# Patient Record
Sex: FEMALE | Race: WHITE | Employment: FULL TIME | URBAN - METROPOLITAN AREA
[De-identification: names, ages, dates, MRNs, and addresses within clinical notes are randomized per-mention and may not be internally consistent; named-entity substitution may affect disease eponyms.]

---

## 2022-08-25 ENCOUNTER — APPOINTMENT (OUTPATIENT)
Dept: GENERAL RADIOLOGY | Age: 57
End: 2022-08-25
Payer: COMMERCIAL

## 2022-08-25 ENCOUNTER — APPOINTMENT (OUTPATIENT)
Dept: CT IMAGING | Age: 57
End: 2022-08-25
Payer: COMMERCIAL

## 2022-08-25 ENCOUNTER — HOSPITAL ENCOUNTER (OUTPATIENT)
Age: 57
Setting detail: OBSERVATION
Discharge: HOME HEALTH | End: 2022-08-26
Attending: STUDENT IN AN ORGANIZED HEALTH CARE EDUCATION/TRAINING PROGRAM | Admitting: INTERNAL MEDICINE
Payer: COMMERCIAL

## 2022-08-25 ENCOUNTER — APPOINTMENT (OUTPATIENT)
Dept: MRI IMAGING | Age: 57
End: 2022-08-25
Payer: COMMERCIAL

## 2022-08-25 DIAGNOSIS — G08 TRANSVERSE SINUS THROMBOSIS: Primary | ICD-10-CM

## 2022-08-25 LAB
ALBUMIN SERPL-MCNC: 4.5 G/DL (ref 3.5–5.2)
ALP BLD-CCNC: 102 U/L (ref 35–104)
ALT SERPL-CCNC: 18 U/L (ref 0–32)
ANION GAP SERPL CALCULATED.3IONS-SCNC: 12 MMOL/L (ref 7–16)
AST SERPL-CCNC: 20 U/L (ref 0–31)
BASOPHILS ABSOLUTE: 0.05 E9/L (ref 0–0.2)
BASOPHILS RELATIVE PERCENT: 0.6 % (ref 0–2)
BILIRUB SERPL-MCNC: 0.3 MG/DL (ref 0–1.2)
BUN BLDV-MCNC: 22 MG/DL (ref 6–20)
CALCIUM SERPL-MCNC: 9.8 MG/DL (ref 8.6–10.2)
CHLORIDE BLD-SCNC: 104 MMOL/L (ref 98–107)
CO2: 23 MMOL/L (ref 22–29)
CREAT SERPL-MCNC: 0.8 MG/DL (ref 0.5–1)
EKG ATRIAL RATE: 100 BPM
EKG P AXIS: 47 DEGREES
EKG P-R INTERVAL: 132 MS
EKG Q-T INTERVAL: 350 MS
EKG QRS DURATION: 88 MS
EKG QTC CALCULATION (BAZETT): 451 MS
EKG R AXIS: 59 DEGREES
EKG T AXIS: -9 DEGREES
EKG VENTRICULAR RATE: 100 BPM
EOSINOPHILS ABSOLUTE: 0.26 E9/L (ref 0.05–0.5)
EOSINOPHILS RELATIVE PERCENT: 3.3 % (ref 0–6)
GFR AFRICAN AMERICAN: >60
GFR NON-AFRICAN AMERICAN: >60 ML/MIN/1.73
GLUCOSE BLD-MCNC: 117 MG/DL (ref 74–99)
HCT VFR BLD CALC: 41.6 % (ref 34–48)
HEMOGLOBIN: 14 G/DL (ref 11.5–15.5)
IMMATURE GRANULOCYTES #: 0.04 E9/L
IMMATURE GRANULOCYTES %: 0.5 % (ref 0–5)
LYMPHOCYTES ABSOLUTE: 2.07 E9/L (ref 1.5–4)
LYMPHOCYTES RELATIVE PERCENT: 26.2 % (ref 20–42)
MCH RBC QN AUTO: 32.9 PG (ref 26–35)
MCHC RBC AUTO-ENTMCNC: 33.7 % (ref 32–34.5)
MCV RBC AUTO: 97.7 FL (ref 80–99.9)
MONOCYTES ABSOLUTE: 0.5 E9/L (ref 0.1–0.95)
MONOCYTES RELATIVE PERCENT: 6.3 % (ref 2–12)
NEUTROPHILS ABSOLUTE: 4.97 E9/L (ref 1.8–7.3)
NEUTROPHILS RELATIVE PERCENT: 63.1 % (ref 43–80)
PDW BLD-RTO: 13.5 FL (ref 11.5–15)
PLATELET # BLD: 282 E9/L (ref 130–450)
PMV BLD AUTO: 8.6 FL (ref 7–12)
POTASSIUM REFLEX MAGNESIUM: 4.4 MMOL/L (ref 3.5–5)
RBC # BLD: 4.26 E12/L (ref 3.5–5.5)
SODIUM BLD-SCNC: 139 MMOL/L (ref 132–146)
TOTAL PROTEIN: 7.5 G/DL (ref 6.4–8.3)
TROPONIN, HIGH SENSITIVITY: <6 NG/L (ref 0–9)
TROPONIN, HIGH SENSITIVITY: <6 NG/L (ref 0–9)
WBC # BLD: 7.9 E9/L (ref 4.5–11.5)

## 2022-08-25 PROCEDURE — 96372 THER/PROPH/DIAG INJ SC/IM: CPT

## 2022-08-25 PROCEDURE — 96374 THER/PROPH/DIAG INJ IV PUSH: CPT

## 2022-08-25 PROCEDURE — 6360000002 HC RX W HCPCS: Performed by: INTERNAL MEDICINE

## 2022-08-25 PROCEDURE — 93005 ELECTROCARDIOGRAM TRACING: CPT | Performed by: STUDENT IN AN ORGANIZED HEALTH CARE EDUCATION/TRAINING PROGRAM

## 2022-08-25 PROCEDURE — 2580000003 HC RX 258: Performed by: STUDENT IN AN ORGANIZED HEALTH CARE EDUCATION/TRAINING PROGRAM

## 2022-08-25 PROCEDURE — 70544 MR ANGIOGRAPHY HEAD W/O DYE: CPT

## 2022-08-25 PROCEDURE — 72125 CT NECK SPINE W/O DYE: CPT

## 2022-08-25 PROCEDURE — 85025 COMPLETE CBC W/AUTO DIFF WBC: CPT

## 2022-08-25 PROCEDURE — 70553 MRI BRAIN STEM W/O & W/DYE: CPT

## 2022-08-25 PROCEDURE — 6360000002 HC RX W HCPCS: Performed by: STUDENT IN AN ORGANIZED HEALTH CARE EDUCATION/TRAINING PROGRAM

## 2022-08-25 PROCEDURE — 6360000004 HC RX CONTRAST MEDICATION: Performed by: STUDENT IN AN ORGANIZED HEALTH CARE EDUCATION/TRAINING PROGRAM

## 2022-08-25 PROCEDURE — 6370000000 HC RX 637 (ALT 250 FOR IP): Performed by: INTERNAL MEDICINE

## 2022-08-25 PROCEDURE — 6360000004 HC RX CONTRAST MEDICATION: Performed by: RADIOLOGY

## 2022-08-25 PROCEDURE — 36415 COLL VENOUS BLD VENIPUNCTURE: CPT

## 2022-08-25 PROCEDURE — 99285 EMERGENCY DEPT VISIT HI MDM: CPT

## 2022-08-25 PROCEDURE — G0378 HOSPITAL OBSERVATION PER HR: HCPCS

## 2022-08-25 PROCEDURE — 70450 CT HEAD/BRAIN W/O DYE: CPT

## 2022-08-25 PROCEDURE — 71045 X-RAY EXAM CHEST 1 VIEW: CPT

## 2022-08-25 PROCEDURE — 80053 COMPREHEN METABOLIC PANEL: CPT

## 2022-08-25 PROCEDURE — 84484 ASSAY OF TROPONIN QUANT: CPT

## 2022-08-25 PROCEDURE — 70496 CT ANGIOGRAPHY HEAD: CPT

## 2022-08-25 PROCEDURE — A9577 INJ MULTIHANCE: HCPCS | Performed by: RADIOLOGY

## 2022-08-25 RX ORDER — FENTANYL CITRATE 50 UG/ML
25 INJECTION, SOLUTION INTRAMUSCULAR; INTRAVENOUS ONCE
Status: COMPLETED | OUTPATIENT
Start: 2022-08-25 | End: 2022-08-25

## 2022-08-25 RX ORDER — 0.9 % SODIUM CHLORIDE 0.9 %
1000 INTRAVENOUS SOLUTION INTRAVENOUS ONCE
Status: COMPLETED | OUTPATIENT
Start: 2022-08-25 | End: 2022-08-25

## 2022-08-25 RX ORDER — ORPHENADRINE CITRATE 30 MG/ML
60 INJECTION INTRAMUSCULAR; INTRAVENOUS ONCE
Status: COMPLETED | OUTPATIENT
Start: 2022-08-25 | End: 2022-08-25

## 2022-08-25 RX ORDER — ENOXAPARIN SODIUM 100 MG/ML
40 INJECTION SUBCUTANEOUS DAILY
Status: DISCONTINUED | OUTPATIENT
Start: 2022-08-25 | End: 2022-08-26 | Stop reason: HOSPADM

## 2022-08-25 RX ORDER — SODIUM CHLORIDE 0.9 % (FLUSH) 0.9 %
10 SYRINGE (ML) INJECTION PRN
Status: COMPLETED | OUTPATIENT
Start: 2022-08-25 | End: 2022-08-25

## 2022-08-25 RX ORDER — NICOTINE 21 MG/24HR
1 PATCH, TRANSDERMAL 24 HOURS TRANSDERMAL DAILY
Status: DISCONTINUED | OUTPATIENT
Start: 2022-08-25 | End: 2022-08-26 | Stop reason: HOSPADM

## 2022-08-25 RX ORDER — ACETAMINOPHEN 325 MG/1
650 TABLET ORAL EVERY 4 HOURS PRN
Status: DISCONTINUED | OUTPATIENT
Start: 2022-08-25 | End: 2022-08-26 | Stop reason: HOSPADM

## 2022-08-25 RX ADMIN — GADOBENATE DIMEGLUMINE 15 ML: 529 INJECTION, SOLUTION INTRAVENOUS at 19:37

## 2022-08-25 RX ADMIN — ENOXAPARIN SODIUM 40 MG: 100 INJECTION SUBCUTANEOUS at 16:43

## 2022-08-25 RX ADMIN — IOPAMIDOL 75 ML: 755 INJECTION, SOLUTION INTRAVENOUS at 03:58

## 2022-08-25 RX ADMIN — FENTANYL CITRATE 25 MCG: 50 INJECTION, SOLUTION INTRAMUSCULAR; INTRAVENOUS at 02:27

## 2022-08-25 RX ADMIN — SODIUM CHLORIDE 1000 ML: 9 INJECTION, SOLUTION INTRAVENOUS at 02:26

## 2022-08-25 RX ADMIN — ORPHENADRINE CITRATE 60 MG: 30 INJECTION INTRAMUSCULAR; INTRAVENOUS at 02:27

## 2022-08-25 RX ADMIN — SODIUM CHLORIDE, PRESERVATIVE FREE 10 ML: 5 INJECTION INTRAVENOUS at 03:58

## 2022-08-25 ASSESSMENT — ENCOUNTER SYMPTOMS
SORE THROAT: 0
ABDOMINAL DISTENTION: 0
EYE PAIN: 0
BACK PAIN: 0
EYE DISCHARGE: 0
DIARRHEA: 0
WHEEZING: 0
EYE REDNESS: 0
NAUSEA: 0
SHORTNESS OF BREATH: 1
SINUS PRESSURE: 0
COUGH: 0
VOMITING: 0

## 2022-08-25 ASSESSMENT — PAIN SCALES - GENERAL
PAINLEVEL_OUTOF10: 6
PAINLEVEL_OUTOF10: 0
PAINLEVEL_OUTOF10: 3
PAINLEVEL_OUTOF10: 7

## 2022-08-25 ASSESSMENT — PAIN DESCRIPTION - LOCATION
LOCATION: NECK

## 2022-08-25 ASSESSMENT — PAIN - FUNCTIONAL ASSESSMENT
PAIN_FUNCTIONAL_ASSESSMENT: ACTIVITIES ARE NOT PREVENTED
PAIN_FUNCTIONAL_ASSESSMENT: 0-10

## 2022-08-25 ASSESSMENT — PAIN DESCRIPTION - DESCRIPTORS: DESCRIPTORS: ACHING;DULL;DISCOMFORT

## 2022-08-25 NOTE — ED NOTES
Patient resting calmly on cart without new complaints. Updated on wait time/waiting for room assignment.  at bedside.       Kirsty Hong RN  08/25/22 [id number]

## 2022-08-25 NOTE — PROGRESS NOTES
Patient seen and examined in the ED this AM  Alert and oriented   Hemodynamically stable  RRR  Lungs clear with diminished BS  Abdomen soft without tenderness  Unremarkable neuro exam  With admit to observation   Neuro consult and MRI

## 2022-08-25 NOTE — ED NOTES
Patient resting with family in the room     [facility]resencio [facility]  08/25/22 6058

## 2022-08-25 NOTE — ED PROVIDER NOTES
Mariana Coker is a 60 y.o. female with a PMHx significant for anxiety who presents for evaluation of left neck pain, left arm numbness, shortness of breath, beginning prior to arrival.  The complaint has been intermittent, moderate in severity, and worsened by nothing. The patient states that yesterday she was having a bit of a stiff neck from sleeping. Notes that tonight she started to get severe pain that woke her up from her sleep. Notes the pain was in the left side of her neck and started to radiate down her left arm causing some numbness. This started about 2 hours prior to arrival. Notes the pain felt like someone was breaking her neck. Made worse with movements as well. Currently states the pain is improved, but is still there. The patient did take an aspirin, because she has history of panic attacks and wasn't sure if it was cardiac. When asked further about why she thought it was cardiac patient notes she has some numbness in her jaw as well. The history is provided by the patient and medical records. Review of Systems   Constitutional:  Positive for fatigue. Negative for chills and fever. HENT:  Negative for ear pain, sinus pressure and sore throat. Eyes:  Negative for pain, discharge and redness. Respiratory:  Positive for shortness of breath. Negative for cough and wheezing. Cardiovascular:  Negative for chest pain. Gastrointestinal:  Negative for abdominal distention, diarrhea, nausea and vomiting. Genitourinary:  Negative for dysuria and frequency. Musculoskeletal:  Negative for arthralgias and back pain. Skin:  Negative for rash and wound. Neurological:  Positive for numbness. Negative for weakness and headaches. Hematological:  Negative for adenopathy. All other systems reviewed and are negative. Physical Exam  Vitals and nursing note reviewed. Constitutional:       General: She is not in acute distress. Appearance: Normal appearance.  She is well-developed. She is not ill-appearing. HENT:      Head: Normocephalic and atraumatic. Right Ear: External ear normal.      Left Ear: External ear normal.   Eyes:      General:         Right eye: No discharge. Left eye: No discharge. Extraocular Movements: Extraocular movements intact. Conjunctiva/sclera: Conjunctivae normal.      Pupils: Pupils are equal, round, and reactive to light. Cardiovascular:      Rate and Rhythm: Normal rate and regular rhythm. Heart sounds: Normal heart sounds. No murmur heard. Pulmonary:      Effort: Pulmonary effort is normal. No respiratory distress. Breath sounds: Normal breath sounds. No stridor. Abdominal:      General: There is no distension. Palpations: Abdomen is soft. There is no mass. Musculoskeletal:         General: No swelling. Cervical back: Normal range of motion and neck supple. Skin:     General: Skin is warm and dry. Coloration: Skin is not jaundiced or pale. Neurological:      General: No focal deficit present. Mental Status: She is alert. Cranial Nerves: No cranial nerve deficit. Coordination: Coordination normal.        Procedures     [facility]       ED Course as of 08/25/22 0736   Thu Aug 25, 2022   1508 Patient re-evaluated. Laying in bed with her  in no acute distress. States that she feels improved at this time. She is not having any more numbness, she has equal radial pulses. [BB]   [id number] Discussed results of imaging with the patient. She continues to be in no acute distress and symptom free at this time. Due to the patient's episode of numbness and findings on CTA she will be admitted for further evaluation and treatment. Patient is agreeable at his time. [BB]   [id number] Spoke with Dr. Cherri Kebede, discussed the patient. He will admit the patient. [BB]      ED Course User Index  [BB] Andi RankincobyDO Tolentino Luis presents to the ED for evaluation of concern for heart attack. Patient notes that she was having severe left sided neck pain followed by numbness of her left arm and jaw. Workup in the ED revealed troponin < 6 with delta the same. No ST-Elevations on EKG. CT head without contrast was ordered secondary to the patient's episode of numbness and discomfort. There was some asymmetric hypoattenuation of the right transverse sinus raising concern for underlying Cerebral venous sinus thrombus therefore a CTA venous head was ordered. This demonstrated a hypodense filling defect in the lateral portion of the right transverse sinus which appears to compress the transverse sinus from the inferior aspect without complete occluding transverse sinus. This hypodense elliptical or ovoid structure measures 1 cm x 1.4 cm x 0.63 cm high probably the structure is an ectopic arachnoid granulation/irregular villous however thrombus in the right transverse sinus cannot be excluded with confidence. . Patient was given fentyl, norflex for their symptoms with improvement. Patient requires continued workup and management of their symptoms and will be admitted to the hospital for further evaluation and treatment.      --------------------------------------------- PAST HISTORY ---------------------------------------------  Past Medical History:  has no past medical history on file. Past Surgical History:  has no past surgical history on file. Social History:  reports that she has been smoking cigarettes. She has never used smokeless tobacco. She reports current alcohol use. She reports that she does not use drugs. Family History: family history is not on file. The patients home medications have been reviewed. Allergies: Patient has no known allergies.     -------------------------------------------------- RESULTS -------------------------------------------------    LABS:  Results for orders placed or performed during the hospital encounter of 08/25/22   Comprehensive Metabolic Panel w/ Reflex to MG   Result Value Ref Range    Sodium 139 132 - 146 mmol/L    Potassium reflex Magnesium 4.4 3.5 - 5.0 mmol/L    Chloride 104 98 - 107 mmol/L    CO2 23 22 - 29 mmol/L    Anion Gap 12 7 - 16 mmol/L    Glucose 117 (H) 74 - 99 mg/dL    BUN 22 (H) 6 - 20 mg/dL    Creatinine 0.8 0.5 - 1.0 mg/dL    GFR Non-African American >60 >=60 mL/min/1.73    GFR African American >60     Calcium 9.8 8.6 - 10.2 mg/dL    Total Protein 7.5 6.4 - 8.3 g/dL    Albumin 4.5 3.5 - 5.2 g/dL    Total Bilirubin 0.3 0.0 - 1.2 mg/dL    Alkaline Phosphatase 102 35 - 104 U/L    ALT 18 0 - 32 U/L    AST 20 0 - 31 U/L   CBC with Auto Differential   Result Value Ref Range    WBC 7.9 4.5 - 11.5 E9/L    RBC 4.26 3.50 - 5.50 E12/L    Hemoglobin 14.0 11.5 - 15.5 g/dL    Hematocrit 41.6 34.0 - 48.0 %    MCV 97.7 80.0 - 99.9 fL    MCH 32.9 26.0 - 35.0 pg    MCHC 33.7 32.0 - 34.5 %    RDW 13.5 11.5 - 15.0 fL    Platelets 436 015 - 982 E9/L    MPV 8.6 7.0 - 12.0 fL    Neutrophils % 63.1 43.0 - 80.0 %    Immature Granulocytes % 0.5 0.0 - 5.0 %    Lymphocytes % 26.2 20.0 - 42.0 %    Monocytes % 6.3 2.0 - 12.0 %    Eosinophils % 3.3 0.0 - 6.0 %    Basophils % 0.6 0.0 - 2.0 %    Neutrophils Absolute 4.97 1.80 - 7.30 E9/L    Immature Granulocytes # 0.04 E9/L    Lymphocytes Absolute 2.07 1.50 - 4.00 E9/L    Monocytes Absolute 0.50 0.10 - 0.95 E9/L    Eosinophils Absolute 0.26 0.05 - 0.50 E9/L    Basophils Absolute 0.05 0.00 - 0.20 E9/L   Troponin   Result Value Ref Range    Troponin, High Sensitivity <6 0 - 9 ng/L   Troponin   Result Value Ref Range    Troponin, High Sensitivity <6 0 - 9 ng/L       RADIOLOGY:  CTA VENOUS HEAD W WO CONTRAST   Preliminary Result   1. There is no demonstrable focal abnormality or acute process in the brain. .   2. In the lateral portion of the right transverse sinus, there is hypodense   filling defect, which appears to compress the transverse sinus from the   inferior aspect without completely occluding the transverse sinus. This   hypodense elliptical or ovoid structure which measures 1 cm x 1.4 cm x 0.63   cm has minimal enhancement. There is high probability that this structure is   an ectopic arachnoid granulation/arachnoid villus. Thrombus in the right   transverse  sinus cannot be excluded with confidence. MR venogram man not   add any additional information. Mild, MRI of brain, without and with   gadolinium contrast may help to differentiate it from thrombus. 3. In the rest of dural sinuses, there is no evidence of thrombus. XR CHEST PORTABLE   Final Result   No acute process. CT Head WO Contrast   Final Result   No acute intracranial hemorrhage. Asymmetric hypoattenuation of the right transverse sinus, raising concern for   underlying hypodense cerebral venous sinus thrombosis, although this would be   an atypical appearance. Recommend clinical correlation. Consider further   evaluation with CT venogram, as clinically indicated. CT Cervical Spine WO Contrast   Final Result   No acute abnormality of the cervical spine. EKG:  This EKG is signed and interpreted by me. Rate: 75  Rhythm: Sinus  Interpretation: non-specific EKG, no ST-elevations or depressions, Q-wave in V2 and V3, T-wave inversion in III, , QRS 88, QTc 439  Comparison: no previous EKG available      ------------------------- NURSING NOTES AND VITALS REVIEWED ---------------------------  Date / Time Roomed:  8/25/2022  1:03 AM  ED Bed Assignment:  15/15    The nursing notes within the ED encounter and vital signs as below have been reviewed.      Patient Vitals for the past 24 hrs:   BP Temp Temp src Pulse Resp SpO2 Height   08/25/22 0451 (!) 140/86 97.9 °F (36.6 °C) Temporal 65 16 99 % --   08/25/22 0105 -- -- -- -- -- -- 5' 5\" (1.651 m)   08/25/22 0057 (!) 194/115 97.9 °F (36.6 °C) Temporal (!) 115 16 98 % --       Oxygen Saturation Interpretation: Normal    ------------------------------------------ PROGRESS NOTES ------------------------------------------  Counseling:  I have spoken with the patient and discussed todays results, in addition to providing specific details for the plan of care and counseling regarding the diagnosis and prognosis. Their questions are answered at this time and they are agreeable with the plan of admission.    --------------------------------- ADDITIONAL PROVIDER NOTES ---------------------------------  Consultations:  Spoke with Dr. Jarrett Brock. Discussed case. They will admit the patient. This patient's ED course included: a personal history and physicial examination, re-evaluation prior to disposition, multiple bedside re-evaluations, IV medications, cardiac monitoring, continuous pulse oximetry, and complex medical decision making and emergency management    This patient has remained hemodynamically stable during their ED course. Diagnosis:  1. Transverse sinus thrombosis        Disposition:  Patient's disposition: Admit to telemetry  Patient's condition is stable. Please note that the above documentation was prepared using voice recognition software. Every attempt was made to ensure accuracy but there may be spelling, grammatical, and contextual errors.            Raj[place]us, [place]  08/25/22 9653

## 2022-08-26 VITALS
TEMPERATURE: 98.2 F | HEIGHT: 65 IN | DIASTOLIC BLOOD PRESSURE: 75 MMHG | BODY MASS INDEX: 24.1 KG/M2 | WEIGHT: 144.62 LBS | SYSTOLIC BLOOD PRESSURE: 132 MMHG | RESPIRATION RATE: 18 BRPM | HEART RATE: 83 BPM | OXYGEN SATURATION: 97 %

## 2022-08-26 LAB
ALBUMIN SERPL-MCNC: 4.3 G/DL (ref 3.5–5.2)
ALP BLD-CCNC: 100 U/L (ref 35–104)
ALT SERPL-CCNC: 20 U/L (ref 0–32)
ANION GAP SERPL CALCULATED.3IONS-SCNC: 8 MMOL/L (ref 7–16)
AST SERPL-CCNC: 25 U/L (ref 0–31)
BILIRUB SERPL-MCNC: 0.3 MG/DL (ref 0–1.2)
BUN BLDV-MCNC: 11 MG/DL (ref 6–20)
CALCIUM SERPL-MCNC: 9.6 MG/DL (ref 8.6–10.2)
CHLORIDE BLD-SCNC: 107 MMOL/L (ref 98–107)
CO2: 25 MMOL/L (ref 22–29)
CREAT SERPL-MCNC: 0.7 MG/DL (ref 0.5–1)
EKG ATRIAL RATE: 75 BPM
EKG P AXIS: 42 DEGREES
EKG P-R INTERVAL: 140 MS
EKG Q-T INTERVAL: 394 MS
EKG QRS DURATION: 88 MS
EKG QTC CALCULATION (BAZETT): 439 MS
EKG R AXIS: 37 DEGREES
EKG T AXIS: 10 DEGREES
EKG VENTRICULAR RATE: 75 BPM
GFR AFRICAN AMERICAN: >60
GFR NON-AFRICAN AMERICAN: >60 ML/MIN/1.73
GLUCOSE BLD-MCNC: 110 MG/DL (ref 74–99)
HCT VFR BLD CALC: 42.8 % (ref 34–48)
HEMOGLOBIN: 14.2 G/DL (ref 11.5–15.5)
MCH RBC QN AUTO: 33.1 PG (ref 26–35)
MCHC RBC AUTO-ENTMCNC: 33.2 % (ref 32–34.5)
MCV RBC AUTO: 99.8 FL (ref 80–99.9)
PDW BLD-RTO: 13.6 FL (ref 11.5–15)
PLATELET # BLD: 278 E9/L (ref 130–450)
PMV BLD AUTO: 8.6 FL (ref 7–12)
POTASSIUM SERPL-SCNC: 4.3 MMOL/L (ref 3.5–5)
RBC # BLD: 4.29 E12/L (ref 3.5–5.5)
SODIUM BLD-SCNC: 140 MMOL/L (ref 132–146)
TOTAL PROTEIN: 7.1 G/DL (ref 6.4–8.3)
WBC # BLD: 6.6 E9/L (ref 4.5–11.5)

## 2022-08-26 PROCEDURE — 85027 COMPLETE CBC AUTOMATED: CPT

## 2022-08-26 PROCEDURE — 6370000000 HC RX 637 (ALT 250 FOR IP): Performed by: INTERNAL MEDICINE

## 2022-08-26 PROCEDURE — 36415 COLL VENOUS BLD VENIPUNCTURE: CPT

## 2022-08-26 PROCEDURE — 6360000002 HC RX W HCPCS: Performed by: INTERNAL MEDICINE

## 2022-08-26 PROCEDURE — 80053 COMPREHEN METABOLIC PANEL: CPT

## 2022-08-26 PROCEDURE — G0378 HOSPITAL OBSERVATION PER HR: HCPCS

## 2022-08-26 PROCEDURE — 96372 THER/PROPH/DIAG INJ SC/IM: CPT

## 2022-08-26 RX ADMIN — ENOXAPARIN SODIUM 40 MG: 100 INJECTION SUBCUTANEOUS at 08:51

## 2022-08-26 NOTE — PROGRESS NOTES
Discharge paperwork, meds and follow up appointments reviewed with pt and dtr. IV and Tele removed. Dtr took belongings. Pt ambulated off unit.

## 2022-08-26 NOTE — PROGRESS NOTES
MRI unremarkable  Mostly asymptomatic at this point  Afebrile , vs stable  RRR  Lungs clear  Neuro intact   Will discharge

## 2022-08-27 NOTE — H&P
[address]                  [address] [facility],  [facility]                              HISTORY AND PHYSICAL    PATIENT NAME: Willy Lara                    :        1965  MED REC NO:   [id number]                            ROOM:       8207  ACCOUNT NO:   [de-identified]                           ADMIT DATE: 2022  PROVIDER:     Meri Flores DO    CHIEF COMPLAINT:  Pain in left side of neck and left arm with numbness,  left upper extremity. HISTORY OF PRESENT ILLNESS:  The patient is a 49-year-old female who is  a smoker, who was in previous good health and has actually been lost to  followup with me in my office for approximately six years, who developed  abrupt onset of severe pain along the left side of her neck and left  upper extremity that awakened her from sleep. Family and the patient  became concerned of possible stroke versus heart attack. This prompted  presentation to the emergency room. There in the [facility] Emergency  Room, CT of brain was done that demonstrated possible cerebral venous  sinus thrombosis. For that reason, a CTV of the head with contrast was  obtained and also demonstrated abnormalities of the brain possibly  consistent with venous sinus thrombosis versus a mass-like effect. Because of this reason, she was directed to admission downtown with  plans for neurologic evaluation and MRI. MEDICATIONS:  She is on no medications. PAST MEDICAL HISTORY:  Unremarkable. FAMILY HISTORY:  None available. SOCIAL HISTORY:  She is a smoker. REVIEW OF SYSTEMS:  NEUROMUSCULAR:  See history of present illness. RESPIRATORY:  Denies shortness of breath, cough, fevers or night sweats. CARDIOVASCULAR:  Denies chest pains or palpitation. GASTROINTESTINAL:  Denies nausea, vomiting, diarrhea, abdominal pain,  melena, hematochezia. GENITOURINARY:  No dysuria, hematuria, oliguria.   MUSCULOSKELETAL:  See history of present illness. PSYCHIATRIC:  Denies. PHYSICAL EXAMINATION:  GENERAL:  The patient is a pleasant 78-year-old female, alert and  oriented, in no acute distress. HEENT:  Head:  Normal size and shape. Pupils are equal and reactive. Sclerae were clear. Fundi were not visualized. Tympanic membranes were  not visualized. NECK:  Supple. Neck veins were flat. No jugular venous distention. No  carotid bruits. LUNGS:  Clear to auscultation with diminished breath sounds. CARDIOVASCULAR:  Regular rate and rhythm. ABDOMEN:  Soft without masses or tenderness. GENITOURINARY:  Deferred. RECTAL:  Deferred. EXTREMITIES:  No swelling. No asymmetry of lower extremity  circumference. NEUROLOGIC:  No focal neurologic deficits. CLINICAL IMPRESSION:  Possible cerebral venous sinus thrombosis.         Cherie dAame DO    D: 08/26/2022 17:49:30       T: 08/26/2022 17:51:44     [email or web address]  Job#: [id number]     Doc#: [id number]    CC:

## 2022-08-27 NOTE — DISCHARGE SUMMARY
[address]                  [address] [facility],  [facility]                               DISCHARGE SUMMARY    PATIENT NAME: Solis Holm                    :        1965  MED REC NO:   [id number]                            ROOM:       8207  ACCOUNT NO:   [de-identified]                           ADMIT DATE: 2022  PROVIDER:     Vale Hartman DO                  DISCHARGE DATE:  2022    HOSPITAL COURSE:  The patient is a 57-year-old female, smoker, and in  previous good medical health, who was admitted after CT exam of brain  and subsequent CTV of the head with contrast demonstrated abnormality  possibly consistent with a venous sinus thrombosis versus other  mass-like effect. The recommendations from radiology were to have her  evaluated by Neurology and have MRI imaging. The patient was admitted  to the observation area. She was placed on Lovenox, Tylenol as needed. MRI with and without contrast was entirely within normal limits. At this point, the patient's consultation with Neurology was canceled. She was discharged to home with plans to follow up with me as an  outpatient. CONDITION ON DISCHARGE:  Improved. FINAL DIAGNOSIS:  Abnormal CAT scan. DISCHARGE INSTRUCTION:  She will follow up with me as outpatient.         Jeromy Cherry DO    D: 2022 17:52:29       T: 2022 17:54:44     [email or web address]  Job#: [id number]     Doc#: [id number]    CC: